# Patient Record
Sex: FEMALE | Race: WHITE | Employment: UNEMPLOYED | URBAN - METROPOLITAN AREA
[De-identification: names, ages, dates, MRNs, and addresses within clinical notes are randomized per-mention and may not be internally consistent; named-entity substitution may affect disease eponyms.]

---

## 2017-07-07 PROCEDURE — 88175 CYTOPATH C/V AUTO FLUID REDO: CPT | Performed by: OBSTETRICS & GYNECOLOGY

## 2018-07-09 PROCEDURE — 88175 CYTOPATH C/V AUTO FLUID REDO: CPT | Performed by: OBSTETRICS & GYNECOLOGY

## 2019-08-19 PROBLEM — Z80.3 FAMILY HISTORY OF BREAST CANCER IN SISTER: Status: ACTIVE | Noted: 2019-08-19

## 2019-08-19 PROCEDURE — 87624 HPV HI-RISK TYP POOLED RSLT: CPT | Performed by: OBSTETRICS & GYNECOLOGY

## 2019-08-19 PROCEDURE — 88175 CYTOPATH C/V AUTO FLUID REDO: CPT | Performed by: OBSTETRICS & GYNECOLOGY

## 2019-10-11 ENCOUNTER — LAB ENCOUNTER (OUTPATIENT)
Dept: LAB | Age: 54
End: 2019-10-11
Attending: PHYSICIAN ASSISTANT
Payer: COMMERCIAL

## 2019-10-11 DIAGNOSIS — B34.9 VIRAL INFECTION: ICD-10-CM

## 2019-10-11 PROCEDURE — 87252 VIRUS INOCULATION TISSUE: CPT

## 2019-10-16 NOTE — PROGRESS NOTES
Viral culture was positive for herpes. Continue to treat with acyclovir ointment and famciclovir, as prescribed at the last office visit.

## 2019-10-16 NOTE — PROGRESS NOTES
Spoke with pt informing of Mireya's message below. Patient stated that she gets a breakout every 2-3 years, and how she should go about getting refills when the script expires after a year.  Explained needing to be seen at least once a year so she would requ

## 2020-08-27 ENCOUNTER — GENETICS ENCOUNTER (OUTPATIENT)
Dept: GENETICS | Facility: HOSPITAL | Age: 55
End: 2020-08-27
Attending: GENETIC COUNSELOR, MS
Payer: COMMERCIAL

## 2020-08-27 ENCOUNTER — NURSE ONLY (OUTPATIENT)
Dept: HEMATOLOGY/ONCOLOGY | Facility: HOSPITAL | Age: 55
End: 2020-08-27
Attending: GENETIC COUNSELOR, MS
Payer: COMMERCIAL

## 2020-08-27 DIAGNOSIS — Z80.3 FAMILY HISTORY OF BREAST CANCER IN SISTER: ICD-10-CM

## 2020-08-27 PROCEDURE — 36415 COLL VENOUS BLD VENIPUNCTURE: CPT

## 2020-08-27 PROCEDURE — 96040 HC GENETIC COUNSELING EA 30 MIN: CPT | Performed by: GENETIC COUNSELOR, MS

## 2020-08-27 NOTE — PROGRESS NOTES
Referring Provider:  Garcia Good MD    Additional Provider(s):  MD Dharmesh Carlos MD    Reason for Referral:  Iggy Hoyt was referred for genetic counseling because of a family history of breast cancer.   Ms. 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   Signs of a hereditary cancer syndrome include some rare cancers, common cancers occurring at unusually young ages, multiple primary cancers in the some individual, or the same type of cancer or related cancers (e.g., breast and ovarian, colorect DNA change that may or may not alter the function of the gene; therefore, it is usually not possible to determine if the gene variant is responsible for an individual’s increased cancer risk.      If Ms. Tsering Lara is found to carry a pathogenic variant in a screening using mammograms and MRI, ovarian cancer screening, chemoprevention, and prophylactic surgery. Men with a BRCA1/2 mutation should discuss clinical breast exams, digital rectal exam and prostate specific antigen screening with their physicians.  A

## 2020-09-23 ENCOUNTER — GENETICS ENCOUNTER (OUTPATIENT)
Dept: HEMATOLOGY/ONCOLOGY | Facility: HOSPITAL | Age: 55
End: 2020-09-23

## 2020-09-23 NOTE — PROGRESS NOTES
Referring Provider:                    Laura Gandhi MD     Additional Provider(s):              MD Hector Lyons MD     Reason for Referral:  Nelida Olivarez had genetic testing per relatives should speak with their physicians to discuss recommended medical management according to their personal and family history.     Ms. Franklineen Tiffanie estimated lifetime risk for breast cancer remains elevated over that of the general population based o

## 2021-12-09 ENCOUNTER — TELEPHONE (OUTPATIENT)
Dept: GENETICS | Facility: HOSPITAL | Age: 56
End: 2021-12-09

## 2021-12-09 NOTE — TELEPHONE ENCOUNTER
Patient was seen in 2020 and Ita Mcdaniel recommended she get mammogram & MRI every 6 months.  Based on her last mammogram there has been an improvement and she was wondering does she still need to get an MRI every 6 months

## 2022-03-09 ENCOUNTER — TELEPHONE (OUTPATIENT)
Dept: HEMATOLOGY/ONCOLOGY | Facility: HOSPITAL | Age: 57
End: 2022-03-09

## 2022-03-09 NOTE — TELEPHONE ENCOUNTER
Returned patient's call from 3/3/22. Pt advised that her estimated lifetime risk for breast cancer is estimated to be ~20% by TCv8 and 17% with competing mortality. Pt is eligible for increased breast surveillance using breast MRI. Pt had one screening breast MRI prior to Covid. Pt encouraged to talk with her new gyn if she desires breast MRI (pt feels she is unlikely to pursue at this time). Patient should call me back if she would like me to formally run a new TCv8 and send this to her new gyn to justify breast MRI.